# Patient Record
Sex: MALE | Race: OTHER | HISPANIC OR LATINO | URBAN - METROPOLITAN AREA
[De-identification: names, ages, dates, MRNs, and addresses within clinical notes are randomized per-mention and may not be internally consistent; named-entity substitution may affect disease eponyms.]

---

## 2020-11-09 ENCOUNTER — EMERGENCY (EMERGENCY)
Facility: HOSPITAL | Age: 30
LOS: 0 days | Discharge: HOME | End: 2020-11-09
Payer: COMMERCIAL

## 2020-11-09 VITALS
SYSTOLIC BLOOD PRESSURE: 128 MMHG | TEMPERATURE: 98 F | OXYGEN SATURATION: 100 % | HEART RATE: 108 BPM | RESPIRATION RATE: 18 BRPM | WEIGHT: 160.06 LBS | DIASTOLIC BLOOD PRESSURE: 80 MMHG

## 2020-11-09 VITALS
OXYGEN SATURATION: 98 % | DIASTOLIC BLOOD PRESSURE: 72 MMHG | HEART RATE: 86 BPM | RESPIRATION RATE: 18 BRPM | TEMPERATURE: 98 F | SYSTOLIC BLOOD PRESSURE: 118 MMHG

## 2020-11-09 DIAGNOSIS — S20.212A CONTUSION OF LEFT FRONT WALL OF THORAX, INITIAL ENCOUNTER: ICD-10-CM

## 2020-11-09 DIAGNOSIS — R07.9 CHEST PAIN, UNSPECIFIED: ICD-10-CM

## 2020-11-09 DIAGNOSIS — S46.912A STRAIN OF UNSPECIFIED MUSCLE, FASCIA AND TENDON AT SHOULDER AND UPPER ARM LEVEL, LEFT ARM, INITIAL ENCOUNTER: ICD-10-CM
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
## 2020-11-09 DIAGNOSIS — F07.81 POSTCONCUSSIONAL SYNDROME: ICD-10-CM

## 2020-11-09 DIAGNOSIS — M25.512 PAIN IN LEFT SHOULDER: ICD-10-CM

## 2020-11-09 DIAGNOSIS — M79.652 PAIN IN LEFT THIGH: ICD-10-CM

## 2020-11-09 PROCEDURE — 73030 X-RAY EXAM OF SHOULDER: CPT | Mod: 26,LT

## 2020-11-09 PROCEDURE — G1004: CPT

## 2020-11-09 PROCEDURE — 70450 CT HEAD/BRAIN W/O DYE: CPT | Mod: 26,ME

## 2020-11-09 PROCEDURE — 73552 X-RAY EXAM OF FEMUR 2/>: CPT | Mod: 26,LT

## 2020-11-09 PROCEDURE — 93010 ELECTROCARDIOGRAM REPORT: CPT

## 2020-11-09 PROCEDURE — 71046 X-RAY EXAM CHEST 2 VIEWS: CPT | Mod: 26

## 2020-11-09 PROCEDURE — 99285 EMERGENCY DEPT VISIT HI MDM: CPT

## 2020-11-09 RX ORDER — CYCLOBENZAPRINE HYDROCHLORIDE 10 MG/1
1 TABLET, FILM COATED ORAL
Qty: 9 | Refills: 0
Start: 2020-11-09 | End: 2020-11-11

## 2020-11-09 RX ORDER — OXYCODONE AND ACETAMINOPHEN 5; 325 MG/1; MG/1
1 TABLET ORAL ONCE
Refills: 0 | Status: DISCONTINUED | OUTPATIENT
Start: 2020-11-09 | End: 2020-11-09

## 2020-11-09 RX ADMIN — OXYCODONE AND ACETAMINOPHEN 1 TABLET(S): 5; 325 TABLET ORAL at 14:42

## 2020-11-09 RX ADMIN — OXYCODONE AND ACETAMINOPHEN 1 TABLET(S): 5; 325 TABLET ORAL at 15:39
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
## 2020-11-09 NOTE — ED PROVIDER NOTE - CARE PLAN
Principal Discharge DX:	Shoulder strain, left, initial encounter  Secondary Diagnosis:	Chest wall contusion, left, initial encounter  Secondary Diagnosis:	Post concussive syndrome
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
## 2020-11-09 NOTE — ED PROVIDER NOTE - MUSCULOSKELETAL, MLM
Spine appears normal- no mdline spinal tenderness, pain with ROM of left shoulder, diffuse tenderness extending to anterior chest wall on left side, left mid thigh tendernes, no edema/ecchymosis, soft compartment, good ROM of all joints otherwise, range of motion is not limited, no muscle or joint tenderness

## 2020-11-09 NOTE — ED PROVIDER NOTE - CLINICAL SUMMARY MEDICAL DECISION MAKING FREE TEXT BOX
[FreeTextEntry1] : 42 y/o F with hx of left breast cancer s/p mastectomy, no XRT/CTX.\par s/p delayed left breast reconstruction with TE/AlloDerm in the process of expansion 3/3/20\par \par 3 months s/p left TE exchange to permanent silicone implant and excision of left axillary roll, doing well\par Pt happy\par \par -May proceed with ordering bra prosthesis\par -Tylenol PRN\par -C/w ambulation\par -c/w scar cream daily and scar massage\par -Offered right breast symmetry procedure; pt declined at this time and will consider in the future\par -Pt knows to call for fevers, chills, redness, swelling\par -F/u 3 months for scar band check.\par \par Photos were taken
Patient presents after fall yesterday, he was reproducible tenderness of the shoulder and chest wall, ecg without acute abnormality, xrays within normal limits, head injury- no LOC but flashing lights and mild headache, head CT normal. recommend ibuprofen PRN pain, f/u with PMD, reviewed reasons to return.

## 2020-11-09 NOTE — ED PROVIDER NOTE - CARDIAC, MLM
Normal rate(90 on my exam HR), regular rhythm.  Heart sounds S1, S2.  No murmurs, rubs or gallops. 2+ DP and PT pulses bilaterally

## 2020-11-09 NOTE — ED PROVIDER NOTE - PATIENT PORTAL LINK FT
You can access the FollowMyHealth Patient Portal offered by [facility] by registering at the following website: [email or web address]. By joining [employer]’s FollowMyHealth portal, you will also be able to view your health information using other applications (apps) compatible with our system.
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
## 2021-03-30 NOTE — ED ADULT NURSE NOTE - PRO INTERPRETER NEED 2
Sure  If he feels we can review at appt we can wait, if not we can switch to vial now     Thanks  English

## 2022-04-26 ENCOUNTER — EMERGENCY (EMERGENCY)
Facility: HOSPITAL | Age: 32
LOS: 1 days | Discharge: HOME | End: 2022-04-26
Attending: STUDENT IN AN ORGANIZED HEALTH CARE EDUCATION/TRAINING PROGRAM | Admitting: STUDENT IN AN ORGANIZED HEALTH CARE EDUCATION/TRAINING PROGRAM
Payer: COMMERCIAL

## 2022-04-26 VITALS
DIASTOLIC BLOOD PRESSURE: 85 MMHG | TEMPERATURE: 98 F | RESPIRATION RATE: 16 BRPM | HEART RATE: 62 BPM | SYSTOLIC BLOOD PRESSURE: 123 MMHG | OXYGEN SATURATION: 100 %

## 2022-04-26 PROBLEM — Z78.9 OTHER SPECIFIED HEALTH STATUS: Chronic | Status: ACTIVE | Noted: 2020-11-09

## 2022-04-26 PROCEDURE — 99282 EMERGENCY DEPT VISIT SF MDM: CPT

## 2022-04-26 NOTE — ED PROVIDER NOTE - OBJECTIVE STATEMENT
32 year old M with no significant PMH presenting with bleeding external hemorrhoid x2 days. Patient has had hemorrhoids in past. Never seen by colorectal or had excised. Presents with bleeding from external hemorrhoids and pain. Seen at GI this morning. Told to come to ED for possible excision. Denies history of constipation, straining on toilet, urinary symptoms, fevers, abdominal pain, N/V/D.

## 2022-04-26 NOTE — ED PROVIDER NOTE - NSFOLLOWUPINSTRUCTIONS_ED_ALL_ED_FT
Your discharge diagnosis is: external hemorrhoid  Please take all discharge medications as indicated below:  Take Motrin/Tylenol for pain as needed, please follow instructions on manufacturers label. If you have any questions please consult a pharmacist or your PMD.  Please follow up with your PMD within x48 hours.  Please follow up with [facility] within x48 hours.  A list of providers for follow up has been given to you.  A copy of resulted labs, imaging, and findings have been provided to you.   You have had a detailed discussion with your provider regarding your diagnosis, care management and discharge planning including, but not limited to: return precautions, follow up visits with existing or new providers, new prescriptions and/or medication changes, wound and/or splint/cast care or other care   aspects specific to your diagnosis and treatment. You have been given the opportunity to have your questions answered. At this time you have been deemed stable and fit for discharge.  Return precautions to the Emergency Department include but are not limited to: unrelenting nausea, vomiting, fever, chills, chest pain, shortness of breath, dizziness, chest or abdominal pain, worsening back pain, syncope, blood in urine or stool, headache that doesn't resolve, numbness or tingling, loss of sensation, loss of motor function, or any other concerning symptoms.      Hemorrhoids       Hemorrhoids are swollen veins in and around the rectum or anus. There are two types of hemorrhoids:  •Internal hemorrhoids. These occur in the veins that are just inside the rectum. They may poke through to the outside and become irritated and painful.      •External hemorrhoids. These occur in the veins that are outside the anus and can be felt as a painful swelling or hard lump near the anus.      Most hemorrhoids do not cause serious problems, and they can be managed with home treatments such as diet and lifestyle changes. If home treatments do not help the symptoms, procedures can be done to shrink or remove the hemorrhoids.      What are the causes?    This condition is caused by increased pressure in the anal area. This pressure may result from various things, including:  •Constipation.      •Straining to have a bowel movement.      •Diarrhea.      •Pregnancy.      •Obesity.      •Sitting for long periods of time.      •Heavy lifting or other activity that causes you to strain.      •Anal sex.      •Riding a bike for a long period of time.        What are the signs or symptoms?    Symptoms of this condition include:  •Pain.      •Anal itching or irritation.      •Rectal bleeding.      •Leakage of stool (feces).      •Anal swelling.      •One or more lumps around the anus.        How is this diagnosed?    This condition can often be diagnosed through a visual exam. Other exams or tests may also be done, such as:  •An exam that involves feeling the rectal area with a gloved hand (digital rectal exam).      •An exam of the anal canal that is done using a small tube (anoscope).      •A blood test, if you have lost a significant amount of blood.      •A test to look inside the colon using a flexible tube with a camera on the end (sigmoidoscopy or colonoscopy).        How is this treated?    This condition can usually be treated at home. However, various procedures may be done if dietary changes, lifestyle changes, and other home treatments do not help your symptoms. These procedures can help make the hemorrhoids smaller or remove them completely. Some of these procedures involve surgery, and others do not. Common procedures include:  •Rubber band ligation. Rubber bands are placed at the base of the hemorrhoids to cut off their blood supply.      •Sclerotherapy. Medicine is injected into the hemorrhoids to shrink them.      •Infrared coagulation. A type of light energy is used to get rid of the hemorrhoids.      •Hemorrhoidectomy surgery. The hemorrhoids are surgically removed, and the veins that supply them are tied off.      •Stapled hemorrhoidopexy surgery. The surgeon staples the base of the hemorrhoid to the rectal wall.        Follow these instructions at home:      Eating and drinking      •Eat foods that have a lot of fiber in them, such as whole grains, beans, nuts, fruits, and vegetables.      •Ask your health care provider about taking products that have added fiber (fiber supplements).      •Reduce the amount of fat in your diet. You can do this by eating low-fat dairy products, eating less red meat, and avoiding processed foods.      •Drink enough fluid to keep your urine pale yellow.        Managing pain and swelling      •Take warm sitz baths for 20 minutes, 3–4 times a day to ease pain and discomfort. You may do this in a bathtub or using a portable sitz bath that fits over the toilet.    •If directed, apply ice to the affected area. Using ice packs between sitz baths may be helpful.  •Put ice in a plastic bag.      •Place a towel between your skin and the bag.      •Leave the ice on for 20 minutes, 2–3 times a day.        General instructions     •Take over-the-counter and prescription medicines only as told by your health care provider.      •Use medicated creams or suppositories as told.      •Get regular exercise. Ask your health care provider how much and what kind of exercise is best for you. In general, you should do moderate exercise for at least 30 minutes on most days of the week (150 minutes each week). This can include activities such as walking, biking, or yoga.      •Go to the bathroom when you have the urge to have a bowel movement. Do not wait.      •Avoid straining to have bowel movements.      •Keep the anal area dry and clean. Use wet toilet paper or moist towelettes after a bowel movement.      • Do not sit on the toilet for long periods of time. This increases blood pooling and pain.      •Keep all follow-up visits as told by your health care provider. This is important.        Contact a health care provider if you have:    •Increasing pain and swelling that are not controlled by treatment or medicine.      •Difficulty having a bowel movement, or you are unable to have a bowel movement.      •Pain or inflammation outside the area of the hemorrhoids.        Get help right away if you have:    •Uncontrolled bleeding from your rectum.        Summary    •Hemorrhoids are swollen veins in and around the rectum or anus.      •Most hemorrhoids can be managed with home treatments such as diet and lifestyle changes.      •Taking warm sitz baths can help ease pain and discomfort.      •In severe cases, procedures or surgery can be done to shrink or remove the hemorrhoids.      This information is not intended to replace advice given to you by your health care provider. Make sure you discuss any questions you have with your health care provider.

## 2022-04-26 NOTE — ED PROVIDER NOTE - ATTENDING CONTRIBUTION TO CARE
I have personally performed a face to face medical and diagnostic evaluation of the patient. I have discussed with and reviewed the Resident's note and agree with the History, ROS, Physical Exam and MDM unless otherwise indicated. A brief summary of my personal evaluation and impression can be found below.    32M no pmh presents with a bleeding external hemorrhoid x2 days, has had hemorrhoids in past was seen by GI earlier today was told to come to ED for eval for possible excision of hemorrhoids wanted 2nd opinion prior to doing so. No fevers. Notes mild bleeding. No other discharge. No other abdominal pain or discomfort. Denies n/v/f/c/cp/sob. Denies headache, syncope, lightheadedness, dizziness. Denies chest palpitations, abdominal pain. Denies edema. Denies dysuria, hematuria, tarry stools, diarrhea, constipation.     All other ROS negative, except as above and as per HPI and ROS section.    VITALS: Initial triage and subsequent vitals have been reviewed by me.  GEN APPEARANCE: WDWN, alert, non-toxic, NAD  HEAD: Atraumatic.  EYES: PERRLa, EOMI, vision grossly intact.   NECK: Supple  CV: RRR, S1S2, no c/r/m/g. Cap refill <2 seconds. No bruits.   LUNGS: CTAB. No abnormal breath sounds.  ABDOMEN: Soft, NTND. No guarding or rebound.   RECTAL: Exam w BETTY ZAIDI +obvious external hemorrhoid w small opening with small thrombosis passing through, mild thrombosis mild ttp beneath external layer   MSK/EXT: No spinal or extremity point tenderness. No CVA ttp. Pelvis stable. No peripheral edema.  NEURO: Alert, follows commands. Weight bearing normal. Speech normal. Sensation and motor normal x4 extremities.   SKIN: Warm, dry and intact. No rash.  PSYCH: Appropriate    Plan/MDM: 32M no pmh presents with a bleeding external hemorrhoid x2 days, has had hemorrhoids in past was seen by GI earlier today was told to come to ED for eval for possible excision of hemorrhoids wanted 2nd opinion prior to doing so. No fevers. Notes mild bleeding. No other discharge. No other abdominal pain or discomfort, exam vss non toxic PE as above ddx c/f thrombosed external hemorrhoid, pt was offered excision declined, will instruct on supportive care, Strict return precautions were discussed. Pt expressed understanding. Pt to be seen by ED Discharge Lounge team to aid in facilitating rapid outpt follow up and/or coordination of care.

## 2022-04-26 NOTE — ED PROVIDER NOTE - PHYSICAL EXAMINATION
GENERAL: Awake, alert, NAD  HEENT: NC/AT, moist mucous membranes, PERRL, EOMI  LUNGS: CTAB, no wheezes or crackles   CARDIAC: RRR, no m/r/g  ABDOMEN: Soft, normal BS, non tender, non distended, no rebound, no guarding  BACK: No midline spinal tenderness, no CVA tenderness  EXT: No edema, no calf tenderness, 2+ DP pulses bilaterally, no deformities.  NEURO: A&Ox3. Moving all extremities.  SKIN: Warm and dry. No rash.  PSYCH: Normal affect.   RECTAL: Exam chaperoned by Dr. Lindsay - large thrombosed external hernia with visible clot, no brisk bleeding

## 2022-04-26 NOTE — ED PROVIDER NOTE - CLINICAL SUMMARY MEDICAL DECISION MAKING FREE TEXT BOX
32 year old M with no significant PMH presenting with bleeding external hemorrhoid x2 days. Afebrile. Large thrombosed external hemorrhoids, visible external clot. No brisk bleeding. Will offer excision vs f/u with colorectal. Recommend sitz baths.

## 2022-04-26 NOTE — ED PROVIDER NOTE - NS ED ROS FT
CONST: no fevers, no chills  EYES: no pain, no vision changes  ENT: no sore throat, no ear pain, no change in hearing  CV: no chest pain, no leg swelling  RESP: no shortness of breath, no cough  ABD: no abdominal pain, no nausea, no vomiting, no diarrhea, +hemorrhoid, +bleeding and pain  : no dysuria, no flank pain, no hematuria  MSK: no back pain, no extremity pain  NEURO: no headache or additional neurologic complaints  HEME: no easy bleeding  SKIN:  no rash

## 2022-04-26 NOTE — ED PROVIDER NOTE - PATIENT PORTAL LINK FT
You can access the FollowMyHealth Patient Portal offered by [facility] by registering at the following website: [email or web address]. By joining [employer]’s FollowMyHealth portal, you will also be able to view your health information using other applications (apps) compatible with our system.

## 2022-04-27 ENCOUNTER — EMERGENCY (EMERGENCY)
Facility: HOSPITAL | Age: 32
LOS: 1 days | Discharge: HOME | End: 2022-04-27
Attending: EMERGENCY MEDICINE | Admitting: EMERGENCY MEDICINE
Payer: COMMERCIAL

## 2022-04-27 VITALS
HEART RATE: 84 BPM | RESPIRATION RATE: 16 BRPM | TEMPERATURE: 98 F | DIASTOLIC BLOOD PRESSURE: 78 MMHG | OXYGEN SATURATION: 100 % | SYSTOLIC BLOOD PRESSURE: 125 MMHG

## 2022-04-27 PROCEDURE — 99282 EMERGENCY DEPT VISIT SF MDM: CPT

## 2022-04-27 NOTE — ED PROVIDER NOTE - NSFOLLOWUPINSTRUCTIONS_ED_ALL_ED_FT
As we discussed sitz baths, cream, no straining on toilet  hemorrhoids appear to be healing    If still causing pain or getting worse return to ER  have placed you in call back book where you will receive a call regarding appointment with colorectal surgeon.

## 2022-04-27 NOTE — ED PROVIDER NOTE - CLINICAL SUMMARY MEDICAL DECISION MAKING FREE TEXT BOX
discussed with pt  do not feel thrombosed currently and actually have opened up a bit  pt endorses pain has improved over last 2 days  will place in followup book for colorectal  but appears to be healing

## 2022-04-27 NOTE — ED PROVIDER NOTE - PHYSICAL EXAMINATION
chaperone by Katy Hebert   3 non thrombosed hemorrhoids with mild bleeding; middle has black overlying clot  minimally tender to palation

## 2022-04-27 NOTE — ED PROVIDER NOTE - PATIENT PORTAL LINK FT
You can access the FollowMyHealth Patient Portal offered by [facility] by registering at the following website: [email or web address]. By joining [employer]’s FollowMyHealth portal, you will also be able to view your health information using other applications (apps) compatible with our system.

## 2022-04-27 NOTE — ED ADULT TRIAGE NOTE - CHIEF COMPLAINT QUOTE
Pt c/o hemorrhoids since last Thursday. Had some bleeding to site over last X 2 days. Was seen here yesterday, offered I&D, requesting to have done today. Denies any other pertinent medical Hx.

## 2022-04-27 NOTE — ED PROVIDER NOTE - OBJECTIVE STATEMENT
32 yr old male with hx of hemorrhoids seen here yesterday for thrombosed hemorrhoid where he refused I+D (more like deferred) presents back for evaluation.  Pain has improved since yesterday, bleeding has subsided.  Denies fever.

## 2022-06-09 NOTE — ED PROVIDER NOTE - PMH
06/09/22                            Sanjuanita Dubon  [address] [place] [address] [address] [id number]    To Whom It May Concern: This is to certify Sanjuanita Dubon was evaluated with [facility] on 06/09/22 and can return to regular work on 06/10/22. RESTRICTIONS: 20 pound weight restriction until 06/13/22.              Nini Galan, [address] [facility],[address] Internal [facility], Tomi [address]  [address] [facility] [address]  Phone: [phone] No pertinent past medical history <<----- Click to add NO pertinent Past Medical History

## 2022-07-15 NOTE — ED ADULT NURSE NOTE - NS ED NURSE LEVEL OF CONSCIOUSNESS SPEECH
From: Edilson Gutierrez  To: Dr. Lewis [place]: 7/15/2022 2:39 PM EDT  Subject: Xanax refill    Miriam,  Can you send in a refill prescription for my Xanax to [facility]?  THanks, Bianca Sterling Speaking Coherently

## 2023-04-23 PROBLEM — Z00.00 ENCOUNTER FOR PREVENTIVE HEALTH EXAMINATION: Status: ACTIVE | Noted: 2023-04-23
